# Patient Record
Sex: MALE | Race: BLACK OR AFRICAN AMERICAN | NOT HISPANIC OR LATINO | Employment: PART TIME | URBAN - METROPOLITAN AREA
[De-identification: names, ages, dates, MRNs, and addresses within clinical notes are randomized per-mention and may not be internally consistent; named-entity substitution may affect disease eponyms.]

---

## 2019-05-22 ENCOUNTER — TELEPHONE (OUTPATIENT)
Dept: FAMILY MEDICINE | Facility: CLINIC | Age: 44
End: 2019-05-22

## 2019-05-22 ENCOUNTER — OFFICE VISIT (OUTPATIENT)
Dept: FAMILY MEDICINE | Facility: CLINIC | Age: 44
End: 2019-05-22
Payer: COMMERCIAL

## 2019-05-22 VITALS
TEMPERATURE: 97.4 F | BODY MASS INDEX: 22.16 KG/M2 | DIASTOLIC BLOOD PRESSURE: 72 MMHG | WEIGHT: 155.8 LBS | SYSTOLIC BLOOD PRESSURE: 103 MMHG | HEART RATE: 65 BPM

## 2019-05-22 DIAGNOSIS — J30.1 SEASONAL ALLERGIC RHINITIS DUE TO POLLEN: ICD-10-CM

## 2019-05-22 DIAGNOSIS — J30.1 SEASONAL ALLERGIC RHINITIS DUE TO POLLEN: Primary | ICD-10-CM

## 2019-05-22 DIAGNOSIS — K21.9 GASTROESOPHAGEAL REFLUX DISEASE, ESOPHAGITIS PRESENCE NOT SPECIFIED: ICD-10-CM

## 2019-05-22 DIAGNOSIS — Z71.6 ENCOUNTER FOR SMOKING CESSATION COUNSELING: Primary | ICD-10-CM

## 2019-05-22 RX ORDER — VARENICLINE TARTRATE 1 MG/1
1 TABLET, FILM COATED ORAL 2 TIMES DAILY
Qty: 120 TABLET | Refills: 1 | Status: SHIPPED | OUTPATIENT
Start: 2019-06-22 | End: 2021-04-06

## 2019-05-22 RX ORDER — FLUTICASONE PROPIONATE 50 MCG
1 SPRAY, SUSPENSION (ML) NASAL DAILY
Qty: 18.2 ML | Refills: 3 | Status: SHIPPED | OUTPATIENT
Start: 2019-05-22 | End: 2021-05-21

## 2019-05-22 RX ORDER — FEXOFENADINE HCL 180 MG/1
180 TABLET ORAL DAILY
Qty: 90 TABLET | Refills: 0 | Status: SHIPPED | OUTPATIENT
Start: 2019-05-22 | End: 2021-05-21

## 2019-05-22 RX ORDER — LEVOCETIRIZINE DIHYDROCHLORIDE 5 MG/1
5 TABLET, FILM COATED ORAL EVERY EVENING
Qty: 30 TABLET | Refills: 3 | Status: SHIPPED | OUTPATIENT
Start: 2019-05-22 | End: 2021-05-21

## 2019-05-22 ASSESSMENT — ENCOUNTER SYMPTOMS
RHINORRHEA: 1
DYSURIA: 0
DIARRHEA: 0
SORE THROAT: 0
FEVER: 0
DIZZINESS: 0
HEADACHES: 0
APPETITE CHANGE: 0
ROS GI COMMENTS: REFLUX
VOMITING: 0
CHILLS: 0
COUGH: 0
HEMATURIA: 0
PALPITATIONS: 0
BLOOD IN STOOL: 0
CONSTIPATION: 0
ARTHRALGIAS: 0
SHORTNESS OF BREATH: 0
ABDOMINAL PAIN: 0
LIGHT-HEADEDNESS: 0
EYES NEGATIVE: 1
NAUSEA: 0

## 2019-05-22 NOTE — TELEPHONE ENCOUNTER
Was perscribed levocetirizine looks like this medication needs a PA please send for cetirizine since he has not tried for that before

## 2019-05-22 NOTE — PROGRESS NOTES
Preceptor Attestation:   Patient seen, evaluated and discussed with the resident.   I have verified the content of the note, which accurately reflects my assessment of the patient and the plan of care.   Supervising Physician:  David Strauss MD

## 2019-05-22 NOTE — TELEPHONE ENCOUNTER
Patient stated in note from visit with Dr. Cruz that cetirizine caused him drowsiness. Dr. Cruz noted that he should try fexofenadine if insurance did not cover levocetirizine. E-prescribed fexofenadine to [facility].    Lucille Zendejas MD

## 2019-05-22 NOTE — PROGRESS NOTES
HPI       Bentley OTF Blood is a 43 year old  who presents for   Chief Complaint   Patient presents with     RECHECK     allergies, sneezing a lot, wants to quit smoking     Mr Blood is a 43 year old male who presents for several reasons:  He needs refills of his medication for refulx, he is having allergy symtpoms and would like a non-drowsy allergy medication, and he would like assistance with smoking cessation.  His reflux symptoms have been worse lately during Ramadan because he is going to sleep immediately after eating.  He has out of his omeprazole.  Prior to this he was taking it intermittently as needed.    He also reports significant allergy symptoms including runny nose, sneezing, itching eyes.  Last night he took him Zyrtec.  But it made him drowsy.  Denies fever, chills, ear pain, sore throat, cough.    Patient reports that he has smoked since 1996 (23 years)- average 1/2 pack per day (11.5 pack year smoking history).  Currently interested in quitting.  Currently motivated by ramadan.  Has tried the patch before and it worked for 3 months.  Then started with 1 per day and within 2 months started buying packs again. Does not know what method will work best this time but open to all options.       +++++++      Problem, Medication and Allergy Lists were reviewed and updated if needed..    Patient is an established patient of this clinic..         Review of Systems:   Review of Systems   Constitutional: Negative for appetite change, chills and fever.   HENT: Positive for rhinorrhea and sneezing. Negative for ear pain and sore throat.    Eyes: Negative.    Respiratory: Negative for cough and shortness of breath.    Cardiovascular: Negative for chest pain and palpitations.   Gastrointestinal: Negative for abdominal pain, blood in stool, constipation, diarrhea, nausea and vomiting.        Reflux   Genitourinary: Negative for dysuria and hematuria.   Musculoskeletal: Negative for arthralgias.    Neurological: Negative for dizziness, light-headedness and headaches.            Physical Exam:     Vitals:    05/22/19 1115   BP: 103/72   Pulse: 65   Temp: 97.4  F (36.3  C)   TempSrc: Oral   Weight: 70.7 kg (155 lb 12.8 oz)   PF: 100 L/min     Body mass index is 22.16 kg/m .  Vitals were reviewed and were normal     Physical Exam   Constitutional: He appears well-developed and well-nourished. No distress.   HENT:   Right Ear: External ear normal.   Left Ear: External ear normal.   Mouth/Throat: Oropharynx is clear and moist. No oropharyngeal exudate.   Clear nasal drainage with mildly edematous nasal mucous membranes   Eyes: Conjunctivae are normal.   Cardiovascular: Normal rate, regular rhythm and normal heart sounds.   No murmur heard.  Pulmonary/Chest: Effort normal and breath sounds normal. No respiratory distress. He has no wheezes.   Lymphadenopathy:     He has no cervical adenopathy.   Skin: Skin is warm and dry.   Psychiatric: He has a normal mood and affect.         Results:       Assessment and Plan          1. Gastroesophageal reflux disease, esophagitis presence not specified  Will switch to zantac for as needed relief and to avoid rebound elevated acidity.  Can go back to omeprazole if not improving symptoms.  - ranitidine (ZANTAC) 150 MG tablet; Take 1 tablet (150 mg) by mouth 2 times daily  Dispense: 60 tablet; Refill: 11    2. Encounter for smoking cessation counseling  Discussed setting quit date.  Not being dissuaded by slips.  Discussed using quit plan  - varenicline (CHANTIX SANJUANA) 0.5 MG X 11 & 1 MG X 42 tablet; Take 0.5 mg tab daily for 3 days, THEN 0.5 mg tab twice daily for 4 days, THEN 1 mg twice daily.  Dispense: 53 tablet; Refill: 0  - varenicline (CHANTIX) 1 MG tablet; Take 1 tablet (1 mg) by mouth 2 times daily  Dispense: 120 tablet; Refill: 1    3. Seasonal allergic rhinitis due to pollen  Stop taking zyrtec.  Consider allegra if not covered by insurance  - levocetirizine (XYZAL) 5  MG tablet; Take 1 tablet (5 mg) by mouth every evening  Dispense: 30 tablet; Refill: 3  - fluticasone (FLONASE) 50 MCG/ACT nasal spray; Spray 1 spray into both nostrils daily  Dispense: 18.2 mL; Refill: 3         Medications Discontinued During This Encounter   Medication Reason     nicotine (NICODERM CQ) 21 MG/24HR patch 2h hr      omeprazole (PRILOSEC) 20 MG capsule      rifampin (RIFADIN) 300 MG capsule        Options for treatment and follow-up care were reviewed with the patient. Bentley Blood  engaged in the decision making process and verbalized understanding of the options discussed and agreed with the final plan.    Boni Cruz MD

## 2019-05-22 NOTE — PATIENT INSTRUCTIONS
Here is the plan from today's visit    1. Gastroesophageal reflux disease, esophagitis presence not specified  - ranitidine (ZANTAC) 150 MG tablet; Take 1 tablet (150 mg) by mouth 2 times daily  Dispense: 60 tablet; Refill: 11    2. Encounter for smoking cessation counseling    - varenicline (CHANTIX SANJUANA) 0.5 MG X 11 & 1 MG X 42 tablet; Take 0.5 mg tab daily for 3 days, THEN 0.5 mg tab twice daily for 4 days, THEN 1 mg twice daily.  Dispense: 53 tablet; Refill: 0  - varenicline (CHANTIX) 1 MG tablet; Take 1 tablet (1 mg) by mouth 2 times daily  Dispense: 120 tablet; Refill: 1    3. Seasonal allergic rhinitis due to pollen  Stop taking zyrtec  - levocetirizine (XYZAL) 5 MG tablet; Take 1 tablet (5 mg) by mouth every evening  Dispense: 30 tablet; Refill: 3  - fluticasone (FLONASE) 50 MCG/ACT nasal spray; Spray 1 spray into both nostrils daily  Dispense: 18.2 mL; Refill: 3    QUITPLAN  Services. No judgments. Just help.  Our clinic recommends Quit Plan Services -it Free and Effective    QUITPLAN Services exists for one simple reason. To do everything we can to help you conquer your addiction and become 100 percent tobacco-free. Not with lectures, but with genuine support. Check out all the FREE tools and services we offer, then pick the ones that are right for you. And even if you re not ready right now, we ll be here when you are.    The program combines counseling, medication and community to help any [place] tobacco user quit.     Getting started is easy -   [place] can simply visit [employer] or call [phone] ([phone]) (available 24/7)  Para maggy Madden: [phone] ([phone])    TTY: [phone]    QUITPLAN  Helpline  A complete program. You will receive one-on-one phone coaching sessions with trained tobacco counselors and additional tools to help you quit.    Starter Kit  Patches, Gum or Lozenges  Receive two weeks of free patches, gum or lozenges to help you get started.    Email  Program  Receive a series of emails full of tips, advice and encouragement to help you quit.    Quit Guide  A practical and resourceful printable Quit Guide to help you build your plan to quit.     Text Messaging  Receive tips, games and reminders. The [id number]? program is full of practical advice and encouragement that can help you quit.     Please call or return to clinic if your symptoms don't go away.    Follow up plan  Please make a clinic appointment for follow up with me (BONI ZAPIEN) in 1-3 weeks to discuss other issues we did not do today  Thank you for coming to [facility] today.  Lab Testing:  **If you had lab testing today and your results are reassuring or normal they will be mailed to you or sent through fitaborate within 7 days.   **If the lab tests need quick action we will call you with the results.  The phone number we will call with results is # [phone] (home) . If this is not the best number please call our clinic and change the number.  Medication Refills:  If you need any refills please call your pharmacy and they will contact us.   If you need to  your refill at a new pharmacy, please contact the new pharmacy directly. The new pharmacy will help you get your medications transferred faster.   Scheduling:  If you have any concerns about today's visit or wish to schedule another appointment please call our office during normal business hours [phone] (8-5:00 M-F)  If a referral was made to a [facility] and you don't get a call from central scheduling please call [phone].  If a Mammogram was ordered for you at [facility] call [phone] to schedule or change your appointment.  If you had an XRay/CT/Ultrasound/MRI ordered the number is [phone] to schedule or change your radiology appointment.   Medical Concerns:  If you have urgent medical concerns please call [phone] at any time of the day.    Boni Zapien MD

## 2021-04-06 ENCOUNTER — OFFICE VISIT (OUTPATIENT)
Dept: FAMILY MEDICINE | Facility: CLINIC | Age: 46
End: 2021-04-06
Payer: COMMERCIAL

## 2021-04-06 VITALS
BODY MASS INDEX: 23.31 KG/M2 | WEIGHT: 162.8 LBS | DIASTOLIC BLOOD PRESSURE: 84 MMHG | RESPIRATION RATE: 16 BRPM | OXYGEN SATURATION: 98 % | SYSTOLIC BLOOD PRESSURE: 123 MMHG | HEART RATE: 79 BPM | TEMPERATURE: 98.2 F | HEIGHT: 70 IN

## 2021-04-06 DIAGNOSIS — A63.0 CONDYLOMA ACUMINATUM IN MALE: ICD-10-CM

## 2021-04-06 DIAGNOSIS — K21.00 GASTROESOPHAGEAL REFLUX DISEASE WITH ESOPHAGITIS WITHOUT HEMORRHAGE: ICD-10-CM

## 2021-04-06 DIAGNOSIS — M72.2 PLANTAR FASCIITIS: Primary | ICD-10-CM

## 2021-04-06 DIAGNOSIS — F17.200 TOBACCO USE DISORDER: ICD-10-CM

## 2021-04-06 PROCEDURE — 99213 OFFICE O/P EST LOW 20 MIN: CPT | Mod: GC | Performed by: STUDENT IN AN ORGANIZED HEALTH CARE EDUCATION/TRAINING PROGRAM

## 2021-04-06 ASSESSMENT — MIFFLIN-ST. JEOR: SCORE: 1627.2

## 2021-04-06 NOTE — PROGRESS NOTES
Preceptor Attestation:    I discussed the patient with the resident and evaluated the patient in person. I have verified the content of the note, which accurately reflects my assessment of the patient and the plan of care.   Supervising Physician:  Jonathan Farah MD.

## 2021-04-06 NOTE — PROGRESS NOTES
"    Assessment & Plan     Plantar fasciitis  Wear supportive shoes with heel inserts. Do morning exercises like flexing your feet and massaging the bottom of your foot against something like a soup can or rolling pin or baseball. This will loosen the fascia and help with long term pain relief. You can use ibuprofen to help with pain as well. Ice or heat can help provide comfort as well. Further information provided in AVS    Gastroesophageal reflux disease with esophagitis without hemorrhage  Stable. Refill of the below medication sent today  - omeprazole (PRILOSEC) 20 MG DR capsule  Dispense: 90 capsule; Refill: 1    Tobacco use disorder  Stable. Is still interested in using nicotine gum (as opposed to patch or lozenge). Discussed best method of use and patient expressed understanding.   - nicotine (NICORETTE) 2 MG gum  Dispense: 240 each; Refill: 3    Condyloma acuminatum in male  Multiple lesions in the pubic region. None present on penis shaft or scrotum. Recommend he return for a visit for removal. Advised that it will likely take several visits and treatment (freezing) before the condylomas are healed.       Tobacco Cessation:   reports that he has been smoking cigarettes. He has a 5.00 pack-year smoking history. He has never used smokeless tobacco.  Tobacco Cessation Action Plan: Pharmacotherapies : other Nicotine replacement    See Patient Instructions    No follow-ups on file.    Griselda Smart MD  [facility]    Gale Hagan is a 45 year old who presents for the following health issues:    Left heel pain for the past year. Worst in the morning or if wearing shoes, particularly his dress shoes. Pain improves with use of pumice stone on heel/bottom of foot and when wearing tennis shoes with good support.     Bentley also has some concerns about \"growths\" in his pubic hair that are \"embarrassing.\" He has tried to \"scrub them off\" but they become quite tender and will bleed.      " "    Review of Systems   Constitutional, HEENT, cardiovascular, pulmonary, gi and gu systems are negative, except as otherwise noted.      Objective    /84   Pulse 79   Temp 98.2  F (36.8  C) (Oral)   Resp 16   Ht 1.79 m (5' 10.47\")   Wt 73.8 kg (162 lb 12.8 oz)   SpO2 98%   BMI 23.05 kg/m    Body mass index is 23.05 kg/m .  Physical Exam   GENERAL: healthy, alert and no distress  EYES: Eyes grossly normal to inspection, PERRL and conjunctivae and sclerae normal  NECK: no adenopathy, no asymmetry, masses, or scars and thyroid normal to palpation  RESP: lungs clear to auscultation - no rales, rhonchi or wheezes  CV: regular rate and rhythm, normal S1 S2, no S3 or S4, no murmur, click or rub, no peripheral edema and peripheral pulses strong  ABDOMEN: soft, nontender, no hepatosplenomegaly, no masses and bowel sounds normal  MS: no gross musculoskeletal defects noted, no edema  SKIN: no suspicious rashes; multiple warts - lower abdomen/upper pubic area. None on penis  NEURO: Normal strength and tone, mentation intact and speech normal  PSYCH: mentation appears normal, affect normal/bright    No results found for any visits on 04/06/21.      "

## 2021-04-09 ENCOUNTER — OFFICE VISIT (OUTPATIENT)
Dept: FAMILY MEDICINE | Facility: CLINIC | Age: 46
End: 2021-04-09
Payer: COMMERCIAL

## 2021-04-09 VITALS
HEIGHT: 71 IN | HEART RATE: 90 BPM | DIASTOLIC BLOOD PRESSURE: 69 MMHG | TEMPERATURE: 97.8 F | SYSTOLIC BLOOD PRESSURE: 107 MMHG | OXYGEN SATURATION: 98 % | BODY MASS INDEX: 23.18 KG/M2 | WEIGHT: 165.6 LBS

## 2021-04-09 DIAGNOSIS — F41.1 GENERALIZED ANXIETY DISORDER: ICD-10-CM

## 2021-04-09 DIAGNOSIS — A63.0 CONDYLOMA: Primary | ICD-10-CM

## 2021-04-09 LAB — HIV 1+2 AB+HIV1 P24 AG SERPL QL IA: NONREACTIVE

## 2021-04-09 PROCEDURE — 99213 OFFICE O/P EST LOW 20 MIN: CPT | Mod: 25 | Performed by: FAMILY MEDICINE

## 2021-04-09 PROCEDURE — 86780 TREPONEMA PALLIDUM: CPT | Performed by: FAMILY MEDICINE

## 2021-04-09 PROCEDURE — 87491 CHLMYD TRACH DNA AMP PROBE: CPT | Performed by: FAMILY MEDICINE

## 2021-04-09 PROCEDURE — 17110 DESTRUCTION B9 LES UP TO 14: CPT | Performed by: FAMILY MEDICINE

## 2021-04-09 PROCEDURE — 87389 HIV-1 AG W/HIV-1&-2 AB AG IA: CPT | Performed by: FAMILY MEDICINE

## 2021-04-09 PROCEDURE — 36415 COLL VENOUS BLD VENIPUNCTURE: CPT | Performed by: FAMILY MEDICINE

## 2021-04-09 PROCEDURE — 87591 N.GONORRHOEAE DNA AMP PROB: CPT | Performed by: FAMILY MEDICINE

## 2021-04-09 RX ORDER — IMIQUIMOD 12.5 MG/.25G
CREAM TOPICAL
Qty: 12 PACKET | Refills: 3 | Status: SHIPPED | OUTPATIENT
Start: 2021-04-09 | End: 2021-05-21

## 2021-04-09 ASSESSMENT — MIFFLIN-ST. JEOR: SCORE: 1655.54

## 2021-04-09 NOTE — LETTER
April 11, 2021      Bentley Blood  [address]  [facility] [address]        Dear Bentley,    Thank you for getting your care at [facility]. Please see below for your test results.  Your tests were negative for HIV or other STDs.  I still don't have the syphilis results. My nurse will call you when all the results are back.     Resulted Orders   HIV Antigen Antibody Combo   Result Value Ref Range    HIV Antigen Antibody Combo Nonreactive NR^Nonreactive          Comment:      HIV-1 p24 Ag & HIV-1/HIV-2 Ab Not Detected   NEISSERIA GONORRHOEA PCR   Result Value Ref Range    Specimen Descrip Urine     N Gonorrhea PCR Negative NEG^Negative      Comment:      Negative for N. gonorrhoeae rRNA by transcription mediated amplification.  A negative result by transcription mediated amplification does not preclude   the presence of N. gonorrhoeae infection because results are dependent on   proper and adequate collection, absence of inhibitors, and sufficient rRNA to   be detected.     CHLAMYDIA TRACHOMATIS PCR   Result Value Ref Range    Specimen Description Urine     Chlamydia Trachomatis PCR Negative NEG^Negative      Comment:      Negative for C. trachomatis rRNA by transcription mediated amplification.  A negative result by transcription mediated amplification does not preclude   the presence of C. trachomatis infection because results are dependent on   proper and adequate collection, absence of inhibitors, and sufficient rRNA to   be detected.         If you have any concerns about these results please call and leave a message for me or send a [facility] for Aut[employer]t message to the clinic.    Sincerely,    Dana Amador MD

## 2021-04-09 NOTE — LETTER
April 12, 2021      Bentley Blood  [address]  [place] [facility] [address]        Dear Bentley,    Thank you for getting your care at [facility]. Please see below for your test results.  All your tests were negative. That is good.     Resulted Orders   Treponema Abs w Reflex to RPR and Titer   Result Value Ref Range    Treponema Antibodies Nonreactive NR^Nonreactive      Comment:      Methodology Change: Test performed on the [employer] Liaison XL by Treponema   pallidum Total Antibodies Assay as of 3.17.2020.     HIV Antigen Antibody Combo   Result Value Ref Range    HIV Antigen Antibody Combo Nonreactive NR^Nonreactive          Comment:      HIV-1 p24 Ag & HIV-1/HIV-2 Ab Not Detected   NEISSERIA GONORRHOEA PCR   Result Value Ref Range    Specimen Descrip Urine     N Gonorrhea PCR Negative NEG^Negative      Comment:      Negative for N. gonorrhoeae rRNA by transcription mediated amplification.  A negative result by transcription mediated amplification does not preclude   the presence of N. gonorrhoeae infection because results are dependent on   proper and adequate collection, absence of inhibitors, and sufficient rRNA to   be detected.     CHLAMYDIA TRACHOMATIS PCR   Result Value Ref Range    Specimen Description Urine     Chlamydia Trachomatis PCR Negative NEG^Negative      Comment:      Negative for C. trachomatis rRNA by transcription mediated amplification.  A negative result by transcription mediated amplification does not preclude   the presence of C. trachomatis infection because results are dependent on   proper and adequate collection, absence of inhibitors, and sufficient rRNA to   be detected.         If you have any concerns about these results please call and leave a message for me or send a MyChart message to the clinic.    Sincerely,    Dana Amador MD

## 2021-04-09 NOTE — PATIENT INSTRUCTIONS
Patient Education   Here is the plan from today's visit    1. Condyloma  Start the treatment after a week, once the areas are no longer inflammed. Keep doing it for the next 16 weeks.   Come back in 4 weeks for repeat exam.     - Treponema Abs w Reflex to RPR and Titer  - imiquimod (ALDARA) 5 % external cream; Apply a small sized amount to warts or molluscum three times weekly at bedtime.   Wash off after 8 hours.   May use for up to 16 weeks.  Dispense: 12 packet; Refill: 3  - HIV Antigen Antibody Combo      Please call or return to clinic if your symptoms don't go away.    Follow up plan  Return in about 4 weeks (around 5/7/2021).    Thank you for coming to [facility] today.  Lab Testing:  **If you had lab testing today and your results are reassuring or normal they will be mailed to you or sent through CITYBIZ[employer] within 7 days.   **If the lab tests need quick action we will call you with the results.  **If you are having labs done on a different day, please call [phone] to schedule at [facility] or [phone] for other [facility] locations.   The phone number we will call with results is # [phone] (home) . If this is not the best number please call our clinic and change the number.  Medication Refills:  If you need any refills please call your pharmacy and they will contact us.   If you need to  your refill at a new pharmacy, please contact the new pharmacy directly. The new pharmacy will help you get your medications transferred faster.   Scheduling:  If you have any concerns about today's visit or wish to schedule another appointment please call our office during normal business hours [phone] (8-5:00 M-F)  If a referral was made to a [facility] and you don't get a call from central scheduling please call [phone].  If a Mammogram was ordered for you at [facility] call [phone] to schedule or change your appointment.  If you had an  XRay/CT/Ultrasound/MRI ordered the number is [phone] to schedule or change your radiology appointment.   Medical Concerns:  If you have urgent medical concerns please call [phone] at any time of the day.    Dana Amador MD

## 2021-04-09 NOTE — PROGRESS NOTES
"    Assessment & Plan     Condyloma  Reviewed with Bentley that this could be persistent HPV but also need to consider dysplasia and/or condyloma zina.  Will do full STI testing.   Freeze times 2 to all the lesions done.   Plan to start Aldara in 1 to 2 weeks and to return in 1 month for re-exam to make sure that there is some evidence of improvement. If not, then needs to see urology for more aggressive treatment/biopsy.    - Treponema Abs w Reflex to RPR and Titer  - imiquimod (ALDARA) 5 % external cream; Apply a small sized amount to warts or molluscum three times weekly at bedtime.   Wash off after 8 hours.   May use for up to 16 weeks.  - HIV Antigen Antibody Combo  - NEISSERIA GONORRHOEA PCR  - CHLAMYDIA TRACHOMATIS PCR  - DESTRUCT BENIGN LESION, UP TO 14    Anxiety -   Admits to anxiety and explored whether wants treatment for this.  Will readdress at the next visit.            Tobacco Cessation:   reports that he has been smoking cigarettes. He has a 5.00 pack-year smoking history. He has never used smokeless tobacco.          Return in about 4 weeks (around 5/7/2021).    Dana Amador MD  [facility] ANDRZEJ Hagan is a 45 year old who presents for the following health issues     HPI     Has noticed a big lump in his pubic area.   First seen in 2013 and treated with cream, then got almost fully better, but then it came back and has been about the same.    Told might need surgical removal.   Very anxious about this.   Was in a monogamous relationship at that time (3 years) and then .  No new partners.       Also past history of latent TB - completed 4 months of Rifampin.           Review of Systems         Objective    /69   Pulse 90   Temp 97.8  F (36.6  C) (Oral)   Ht 1.815 m (5' 11.46\")   Wt 75.1 kg (165 lb 9.6 oz)   SpO2 98%   BMI 22.80 kg/m    Body mass index is 22.8 kg/m .  Physical Exam   : has 6 independent lesions at the base of his penis and on the " penis.  See image.    Are verrucous in nature but also more smooth than I might expect.  No adenopathy. Reluctant to have me do more extensive exam

## 2021-04-10 LAB
C TRACH DNA SPEC QL NAA+PROBE: NEGATIVE
N GONORRHOEA DNA SPEC QL NAA+PROBE: NEGATIVE
SPECIMEN SOURCE: NORMAL
SPECIMEN SOURCE: NORMAL

## 2021-04-12 LAB — T PALLIDUM AB SER QL: NONREACTIVE

## 2021-05-21 ENCOUNTER — OFFICE VISIT (OUTPATIENT)
Dept: FAMILY MEDICINE | Facility: CLINIC | Age: 46
End: 2021-05-21
Payer: COMMERCIAL

## 2021-05-21 VITALS
SYSTOLIC BLOOD PRESSURE: 99 MMHG | TEMPERATURE: 97.7 F | DIASTOLIC BLOOD PRESSURE: 63 MMHG | HEART RATE: 77 BPM | RESPIRATION RATE: 12 BRPM | OXYGEN SATURATION: 97 % | HEIGHT: 72 IN | BODY MASS INDEX: 21.73 KG/M2 | WEIGHT: 160.4 LBS

## 2021-05-21 DIAGNOSIS — A63.0 CONDYLOMA: ICD-10-CM

## 2021-05-21 DIAGNOSIS — Z71.84 TRAVEL ADVICE ENCOUNTER: Primary | ICD-10-CM

## 2021-05-21 DIAGNOSIS — Z78.9 HEPATITIS B IMMUNE: ICD-10-CM

## 2021-05-21 PROCEDURE — 99213 OFFICE O/P EST LOW 20 MIN: CPT | Performed by: FAMILY MEDICINE

## 2021-05-21 RX ORDER — IMIQUIMOD 12.5 MG/.25G
CREAM TOPICAL
Qty: 12 PACKET | Refills: 3 | Status: SHIPPED | OUTPATIENT
Start: 2021-05-21

## 2021-05-21 RX ORDER — ATOVAQUONE AND PROGUANIL HYDROCHLORIDE 250; 100 MG/1; MG/1
1 TABLET, FILM COATED ORAL DAILY
Qty: 90 TABLET | Refills: 0 | Status: SHIPPED | OUTPATIENT
Start: 2021-05-21 | End: 2022-12-21

## 2021-05-21 ASSESSMENT — MIFFLIN-ST. JEOR: SCORE: 1632.63

## 2021-05-21 NOTE — PATIENT INSTRUCTIONS
Patient Education   Here is the plan from today's visit    1. Condyloma  Continue to use the CREAM.   Also ok to use the OTC one  Pen is last choice.   - imiquimod (ALDARA) 5 % external cream; Apply a small sized amount to warts or molluscum three times weekly at bedtime.   Wash off after 8 hours.   May use for up to 16 weeks.  Dispense: 12 packet; Refill: 3    2. Travel advice encounter  Typhoid at a travel clinic   - atovaquone-proguanil (MALARONE) 250-100 MG tablet; Take 1 tablet by mouth daily Start 2 days before travel and continue 7 days after return.  Dispense: 90 tablet; Refill: 0      Please call or return to clinic if your symptoms don't go away.    Follow up plan  No follow-ups on file.    Thank you for coming to [facility] today.  Lab Testing:  **If you had lab testing today and your results are reassuring or normal they will be mailed to you or sent through [employer] within 7 days.   **If the lab tests need quick action we will call you with the results.  **If you are having labs done on a different day, please call [phone] to schedule at [facility] or [phone] for other [facility] locations.   The phone number we will call with results is # [phone] (home) . If this is not the best number please call our clinic and change the number.  Medication Refills:  If you need any refills please call your pharmacy and they will contact us.   If you need to  your refill at a new pharmacy, please contact the new pharmacy directly. The new pharmacy will help you get your medications transferred faster.   Scheduling:  If you have any concerns about today's visit or wish to schedule another appointment please call our office during normal business hours [phone] (8-5:00 M-F)  If a referral was made to a [facility] and you don't get a call from central scheduling please call [phone].  If a Mammogram was ordered for you at [facility] call  [phone] to schedule or change your appointment.  If you had an EKG/XRay/CT/Ultrasound/MRI ordered the number is [phone] to schedule or change your radiology appointment.   Medical Concerns:  If you have urgent medical concerns please call [phone] at any time of the day.    Dana Amador MD

## 2021-05-21 NOTE — PROGRESS NOTES
"    Assessment & Plan     Condyloma  Will continue Aldara. Can add salicyclic acid as well if desires. To return if not much improved.  - imiquimod (ALDARA) 5 % external cream; Apply a small sized amount to warts or molluscum three times weekly at bedtime.   Wash off after 8 hours.   May use for up to 16 weeks.    Travel advice encounter  Will be going to [facility] for 2 months to be with his daughter who is an early teen and is struggling with severe depression and suicidality. Aware of the risks of going to [facility] with COVID.   Has been on Melarone in the past. Reviewed how to use.  Aware of needing netting and avoiding twilight  Also that he should get Typhoid and would need to get at a [facility].   - atovaquone-proguanil (MALARONE) 250-100 MG tablet; Take 1 tablet by mouth daily Start 2 days before travel and continue 7 days after return.    Hepatitis B immune  Immune. Will add core Ab to see if natural or immunized.   - Hepatitis B core antibody      28 minutes spent on the date of the encounter doing chart review, history and exam, documentation and further activities per the note           No follow-ups on file.    Dana Amador MD  [facility] ANDRZEJ Hagan is a 45 year old who presents for the following health issues     HPI     Treated his warts with the Aldara and noted some improvement. Also tried what seems to be Compound W.  Is looking to also try the electrodesicator?  Is much improved but not resolved.     Also wondering about his Hepatitis B status. Reviewed that immune.     S/p Hepatitis A at age 1.  Turned jaundice   Not sure if he ever had Hep B.   [place] to the [place] age 24    Glaucoma - inherited from his mother.              Review of Systems         Objective    BP 99/63   Pulse 77   Temp 97.7  F (36.5  C) (Oral)   Resp 12   Ht 1.816 m (5' 11.5\")   Wt 72.8 kg (160 lb 6.4 oz)   SpO2 97%   BMI 22.06 kg/m    Body mass index is 22.06 kg/m .  Physical Exam   Area " above the penis with much decreased condyloma - shrunk by 30%.

## 2022-01-07 DIAGNOSIS — K21.00 GASTROESOPHAGEAL REFLUX DISEASE WITH ESOPHAGITIS WITHOUT HEMORRHAGE: ICD-10-CM

## 2022-01-07 NOTE — TELEPHONE ENCOUNTER
"Request for medication refill: omeprazole    Providers if patient needs an appointment and you are willing to give a one month supply please refill for one month and  send a letter/MyChart using \".SMILLIMITEDREFILL\" .smillimited and route chart to \"P SMI \" (Giving one month refill in non controlled medications is strongly recommended before denial)    If refill has been denied, meaning absolutely no refills without visit, please complete the smart phrase \".smirxrefuse\" and route it to the \"P SMI MED REFILLS\"  pool to inform the patient and the pharmacy.    Litzy Easton, CMA        "

## 2022-12-21 ENCOUNTER — HOSPITAL ENCOUNTER (EMERGENCY)
Facility: CLINIC | Age: 47
Discharge: HOME | End: 2022-12-21
Attending: EMERGENCY MEDICINE | Admitting: EMERGENCY MEDICINE
Payer: COMMERCIAL

## 2022-12-21 ENCOUNTER — APPOINTMENT (OUTPATIENT)
Dept: GENERAL RADIOLOGY | Facility: CLINIC | Age: 47
End: 2022-12-21
Attending: EMERGENCY MEDICINE
Payer: COMMERCIAL

## 2022-12-21 VITALS
HEIGHT: 71 IN | DIASTOLIC BLOOD PRESSURE: 95 MMHG | SYSTOLIC BLOOD PRESSURE: 144 MMHG | HEART RATE: 56 BPM | WEIGHT: 165.5 LBS | OXYGEN SATURATION: 100 % | RESPIRATION RATE: 14 BRPM | BODY MASS INDEX: 23.17 KG/M2 | TEMPERATURE: 98.4 F

## 2022-12-21 DIAGNOSIS — S52.515A NONDISPLACED FRACTURE OF LEFT RADIAL STYLOID PROCESS, INITIAL ENCOUNTER FOR CLOSED FRACTURE: ICD-10-CM

## 2022-12-21 DIAGNOSIS — S52.572A OTH INTARTIC FRACTURE OF LOWER END OF LEFT RADIUS, INIT: ICD-10-CM

## 2022-12-21 PROCEDURE — 99284 EMERGENCY DEPT VISIT MOD MDM: CPT | Mod: 25

## 2022-12-21 PROCEDURE — 73110 X-RAY EXAM OF WRIST: CPT | Mod: LT

## 2022-12-21 PROCEDURE — 29125 APPL SHORT ARM SPLINT STATIC: CPT | Mod: LT

## 2022-12-21 PROCEDURE — 29125 APPL SHORT ARM SPLINT STATIC: CPT | Mod: LT | Performed by: EMERGENCY MEDICINE

## 2022-12-21 PROCEDURE — 99282 EMERGENCY DEPT VISIT SF MDM: CPT | Mod: 25 | Performed by: EMERGENCY MEDICINE

## 2022-12-21 PROCEDURE — 73130 X-RAY EXAM OF HAND: CPT | Mod: LT

## 2022-12-21 ASSESSMENT — ACTIVITIES OF DAILY LIVING (ADL): ADLS_ACUITY_SCORE: 35
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
## 2023-05-14 ENCOUNTER — APPOINTMENT (OUTPATIENT)
Dept: GENERAL RADIOLOGY | Facility: CLINIC | Age: 48
End: 2023-05-14
Attending: NURSE PRACTITIONER
Payer: COMMERCIAL

## 2023-05-14 ENCOUNTER — HOSPITAL ENCOUNTER (EMERGENCY)
Facility: CLINIC | Age: 48
Discharge: HOME | End: 2023-05-14
Admitting: NURSE PRACTITIONER
Payer: COMMERCIAL

## 2023-05-14 VITALS
HEART RATE: 71 BPM | RESPIRATION RATE: 16 BRPM | HEIGHT: 71 IN | TEMPERATURE: 98.4 F | DIASTOLIC BLOOD PRESSURE: 87 MMHG | BODY MASS INDEX: 22.68 KG/M2 | WEIGHT: 162 LBS | OXYGEN SATURATION: 100 % | SYSTOLIC BLOOD PRESSURE: 127 MMHG

## 2023-05-14 DIAGNOSIS — S69.91XA THUMB INJURY, RIGHT, INITIAL ENCOUNTER: ICD-10-CM

## 2023-05-14 PROCEDURE — 99283 EMERGENCY DEPT VISIT LOW MDM: CPT | Performed by: NURSE PRACTITIONER

## 2023-05-14 PROCEDURE — 73140 X-RAY EXAM OF FINGER(S): CPT | Mod: RT,XU

## 2023-05-14 PROCEDURE — 73130 X-RAY EXAM OF HAND: CPT | Mod: RT

## 2023-05-14 PROCEDURE — 99284 EMERGENCY DEPT VISIT MOD MDM: CPT | Performed by: NURSE PRACTITIONER

## 2023-05-14 ASSESSMENT — ACTIVITIES OF DAILY LIVING (ADL): ADLS_ACUITY_SCORE: 35
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
## 2023-05-14 NOTE — DISCHARGE INSTRUCTIONS
TODAY'S VISIT:  You were seen today for right thumb pain  -The x-ray of your thumb and your hand showed no dislocation or fracture  - If you had any labs or imaging/radiology tests performed today, you should also discuss these tests with your usual provider.     FOLLOW-UP:  Please make an appointment to follow up with:  - Orthopedics Clinic (phone: [phone]) in 3-5 days as needed if your symptoms do not completely resolve.  - Your Primary Care Provider. If you do not have a PCP, please call the Primary Care Center (phone: [phone] for an appointment  - Have your provider review the results from today's visit with you again to make sure no further follow-up or additional testing is needed based on those results.     PRESCRIPTIONS / MEDICATIONS:  -Take Tylenol and ibuprofen for pain, rest, elevate and ice the thumb, this may reduce swelling and pain.      OTHER INSTRUCTIONS:  - You may keep your thumb wrapped using the supportive brace, but to make sure that you are taking it out several times a day to exercise the thumb through full range of motion.    RETURN TO THE EMERGENCY DEPARTMENT  Return to the Emergency Department at any time for any new or worsening symptoms or any concerns.

## 2023-05-30 ENCOUNTER — PRE VISIT (OUTPATIENT)
Dept: ORTHOPEDICS | Facility: CLINIC | Age: 48
End: 2023-05-30
Payer: COMMERCIAL
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
